# Patient Record
(demographics unavailable — no encounter records)

---

## 2019-11-08 NOTE — ST
PATIENT:JUJU LEPE                                MEDICAL RECORD: [id number]
SEX: M                                                   LOCATION:[facility]         
ORDER #:                                                 ADMISSION DATE: 11/04/19
AGE OF PATIENT: 53            
 
REFERRING PHYSICIAN:                                                             
 
INTERPRETING PHYSICIAN: AMINTA BARTON MD          

 
 
DATE OF SERVICE:  11/04/2019
 
PROCEDURE:  Treadmill stress test.
 
TECHNIQUE:  Baseline ECG is normal.  Exercised for 10 minutes on Duc protocol.
 Maximum heart rate 171 beats per minute, 100% of max predicted.  No ECG changes
of ischemia.  No symptoms of ischemia.  Normal blood pressure response to
exercise.  No arrhythmias noted.  Good exercise tolerance for age.
 
IMPRESSION:  Negative treadmill stress test.  Good exercise tolerance.
 
TRANSINT:[id number] Voice Confirmation ID: [id number] DOCUMENT ID: [id number]
 
 
                                           
                                           AMINTA BARTON MD          
 
 
 
Electronically Signed by AMINTA BARTON on 11/08/19 at 1351
 
 
 
 
 
 
 
 
 
 
 
 
 
 
 
 
 
 
 
CC:                                                             [id number]
DICTATION DATE: 11/07/19 1259     :     11/07/19 1504      DEP CLI 
                                                                      11/04/19
[id number] [place] [address]